# Patient Record
Sex: MALE | Race: BLACK OR AFRICAN AMERICAN | URBAN - METROPOLITAN AREA
[De-identification: names, ages, dates, MRNs, and addresses within clinical notes are randomized per-mention and may not be internally consistent; named-entity substitution may affect disease eponyms.]

---

## 2019-05-20 ENCOUNTER — HISTORICAL (OUTPATIENT)
Dept: ADMINISTRATIVE | Facility: HOSPITAL | Age: 54
End: 2019-05-20

## 2019-06-19 ENCOUNTER — HISTORICAL (OUTPATIENT)
Dept: ADMINISTRATIVE | Facility: HOSPITAL | Age: 54
End: 2019-06-19

## 2020-03-17 ENCOUNTER — HISTORICAL (OUTPATIENT)
Dept: ADMINISTRATIVE | Facility: HOSPITAL | Age: 55
End: 2020-03-17

## 2022-04-30 NOTE — OP NOTE
DATE OF SURGERY:    05/20/2019    SURGEON:  Luis F Casiano MD    PREOPERATIVE DIAGNOSIS:  Proliferative diabetic retinopathy, with tractional retinal detachment of the left eye, with nonclearing vitreous hemorrhage of the left eye, and active proliferative diabetic retinopathy in both eyes.    PROCEDURE:  Complex traction retinal detachment repair of the right eye, with pars plana vitrectomy, membrane peeling, endo cautery, fluid air exchange, endolaser, and silicone oil infusion, all to the left eye, and indirect laser to the right eye.    ASSISTANT:  Jimena Cooper.    ANESTHESIA:  General.    ESTIMATED BLOOD LOSS:  Less than 5 mL.    COMPLICATIONS:  None.    INDICATIONS:  Mr. Calix has a history of advanced diabetic retinopathy with a tractional retinal detachment of the left eye, with a vitreous hemorrhage, and active neovascularization of the right eye.    PROCEDURE IN DETAIL:  Patient was taken to the operative theater, where general anesthesia was begun.  The left eye was prepped and draped in the normal sterile fashion and a lid speculum was applied.  A standard 3 port 25 gauge pars plana vitrectomy was performed, with all trocars being placed 3.5 mm from the surgical limbus.  A core vitrectomy was performed.  The posterior hyaloid was detached from the retinal surface in the temporal, inferior, and superior retinal peripheries, but was attached to the peripheral retina in the nasal portion of the globe.  The posterior hyaloid was dissected in the mid peripheral vitreous,  the anterior-posterior adhesions in the temporal parts of the vitreous, and the posterior hyaloid was dissected down to the optic nerve.  The premacular membranes were removed with the vitrectomy cutter.  The preretinal membranes along the optic nerve in the nasal retina were segmented with the vitrectomy cutter and curved scissors, isolating them into islands, which were then removed from the retinal surface.  An  inferior nasal and superior nasal retinal break were identified which were both marked with endo cautery.  A fluid air exchange was performed, flattening the entire retina, and endolaser was used to treat the 2 retinal tears, as well as to add panretinal photocoagulation from the arcade vessels to the ora jessie in 360 degrees.  The eye was then filled with 5000 weight silicone oil without complication.  The superior nasal sclerotomy was closed with 7-0 Vicryl suture.  The other 2 sclerotomies were closed with 6-0 fast absorbing gut.  The conjunctiva superior nasally was closed with 6-0 fast absorbing gut.  Several drops of TobraDex ophthalmic solution were applied to the eye.  The postoperative intraocular pressure was 15 mmHg.  The lid speculum and eye drape were then removed and the eye was covered with a gauze patch and a Patel shield.  Indirect laser was then used to apply panretinal photocoagulation from the arcade vessels to the ora jessie in 360 degrees to the right eye without complication.  The patient was awoken from general anesthesia and transported to the postoperative care unit to recover.    DISCHARGE CONDITION:  Good.    DISPOSITION:  Home with followup with Dr. Casiano the following day.  This patient tolerated the procedure well.        ______________________________  Luis F Casiano MD    [facility]/  DD:  05/20/2019  Time:  11:01AM  DT:  05/20/2019  Time:  11:22AM  Job #:  [id number]

## 2022-04-30 NOTE — OP NOTE
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    PROCEDURE:  Pars plana vitrectomy with silicone oil removal to the left eye followed by cataract removal with Dr. Cutler.    ANESTHESIA:  General.    ESTIMATED BLOOD LOSS:  Less than 5 cc.    COMPLICATIONS:  None.    PROCEDURE INDICATIONS:  Mr. Calix has a history of a diabetic tractional retinal detachment repaired with a vitrectomy with silicone oil.  He also has a history of neovascular glaucoma and has a silicone tube shunt placed by Dr. Cutler.  He has a dense cataract and still has silicone oil in the vitreous cavity.  He requires silicone oil removal accompanied by cataract removal.  The silicone oil removal procedure is dictated by Dr. Casiano to be followed by dictation for the cataract operation.    DESCRIPTION OF PROCEDURE:  The patient was taken to the operative theater where general anesthesia was begun.  The left eye was prepped and draped in the normal sterile fashion and a lid speculum was applied.  A 2-port pars plana vitrectomy was performed with a 25-gauge infusion cannula placed inferotemporally and a 20-gauge MVR blade sclerotomy placed superonasally.  The silicone oil was extracted through the supranasal sclerotomy without complication.  An MVR blade was used to create a corneal paracentesis, and silicone oil droplets were aspirated from the anterior chamber using the vitrectomy cutter.  The retina was then examined and no silicone oil was remaining in the eye with the exception of a silicone oil plaque on the posterior capsule.  The retina was completely attached.  The supranasal sclerotomy was closed with 7-0 Vicryl suture and his conjunctiva closed with  6-0 fast absorbing     gut.  The inferotemporal sclerotomy was watertight, so no sutures were applied.  The lid speculum and eye drape were then removed and Dr. Cutler began the cataract operation.        ______________________________  MD DUARTE Ramos/TAHMINA  DD:  03/17/2020  Time:  07:56AM  DT:  03/17/2020  Time:  08:15AM  Job #:  [id number]

## 2022-04-30 NOTE — OP NOTE
DATE OF SURGERY:   6-19-19    SURGEON:  Anastasia Cutler MD    PREOPERATIVE DIAGNOSIS:  Neovascularglaucoma of the left eye.    POSTOPERATIVE DIAGNOSIS:  same status post procedure    PROCEDURE:  Ahmed placement in the left eye with scleral patch graft reinforcement of the sclera left eye.     ANESTHESIA:  MAC plus local.   COMPLICATIONS:  None.   ESTIMATED BLOOD LOSS:  Less than 1 cc.    INDICATIONS:  The patient was evaluated in clinic and noted to have an elevated intraocular pressure on maximum medical therapy.    The advantages, risks and benefits of surgical intervention were discussed with the patient including but not limited to bleeding, infection, decreased vision, loss of the eye and need for subsequent surgery as well as worsening of cataract and diplopia.  The patient acknowledged understanding and wished to proceed.  Informed consent was obtained from the patient in clinic.    PROCEDURE IN DETAIL:  The patient was brought to the operating room and laid supine postion. MAC anesthesia undertaken without complication.  The left eye and periorbital region were prepped and draped in the usual manner for ocular surgery.  A 7-0 Vicryl traction suture was placed in the cornea superiorly and the eye was moved inferiorly for access to the surgical site.  A small peritomy was made at 12 o'clock and injection of preservative free Lidocaine/Epinephrine mixture was injected into sub-Tenon's space for further anesthesia as well as dissection.  The peritomy was then continued for two clock hours using Janessa scissors and undermined.  Wing relaxing incisions were made temporally.  The sub-Tenon space was expanded with blunt tipped Janessa scissors.  Hemostasis was maintained using bipolar cautery.  The Ahmed valve was primed with BSS and then placed into sub-Tenon space  A caliper was used to position the valve precisely 8 mm posterior to the limbus in superotemporal position.  Two 10-0 nylon sutures were used  to secure it to the sclera.  The tube was cut 3 mm long for insertion to the anterior chamber.  23 gauge needle was used to make the incision to the anterior chamber parallel to the iris.  The tube was inserted and noted to be in position in front of the iris and away from the cornea.  The tube was secured to the sclera using a 10-0 nylon suture.  The tube was covered with scleral patch graft which was secured to the sclerae using 10-0 nylon suture .  The conjunctiva was then closed using interrupted and mattress 10-0 nylon sutures.  The IOP was judged to be physiologic.  The tube remains in good position in the anterior chamber.  Postop injections of Ancef and Dexamethasone were injected  subconjunctivally.  The lid speculum was removed.  Maxitrol ointment was placed on the operative eye followed by pressure patch and shield.  The patient left the operating room in stable condition having tolerated the procedure well.